# Patient Record
Sex: MALE | Race: WHITE | URBAN - METROPOLITAN AREA
[De-identification: names, ages, dates, MRNs, and addresses within clinical notes are randomized per-mention and may not be internally consistent; named-entity substitution may affect disease eponyms.]

---

## 2022-06-20 ENCOUNTER — APPOINTMENT (RX ONLY)
Dept: URBAN - METROPOLITAN AREA CLINIC 50 | Facility: CLINIC | Age: 62
Setting detail: DERMATOLOGY
End: 2022-06-20

## 2022-06-20 DIAGNOSIS — L40.0 PSORIASIS VULGARIS: ICD-10-CM | Status: INADEQUATELY CONTROLLED

## 2022-06-20 PROCEDURE — 99204 OFFICE O/P NEW MOD 45 MIN: CPT

## 2022-06-20 PROCEDURE — ? PRESCRIPTION MEDICATION MANAGEMENT

## 2022-06-20 PROCEDURE — ? MEDICATION COUNSELING

## 2022-06-20 PROCEDURE — ? ADDITIONAL NOTES

## 2022-06-20 PROCEDURE — ? COUNSELING

## 2022-06-20 ASSESSMENT — LOCATION ZONE DERM
LOCATION ZONE: FINGERNAIL
LOCATION ZONE: ARM
LOCATION ZONE: HAND
LOCATION ZONE: LEG
LOCATION ZONE: TRUNK

## 2022-06-20 ASSESSMENT — LOCATION DETAILED DESCRIPTION DERM
LOCATION DETAILED: LEFT MIDDLE FINGERNAIL
LOCATION DETAILED: RIGHT MIDDLE FINGERNAIL
LOCATION DETAILED: LEFT RADIAL DORSAL HAND
LOCATION DETAILED: RIGHT PROXIMAL DORSAL FOREARM
LOCATION DETAILED: RIGHT ULNAR DORSAL HAND
LOCATION DETAILED: LEFT PROXIMAL DORSAL FOREARM
LOCATION DETAILED: PERIUMBILICAL SKIN
LOCATION DETAILED: RIGHT KNEE
LOCATION DETAILED: LEFT KNEE
LOCATION DETAILED: SUPERIOR LUMBAR SPINE

## 2022-06-20 ASSESSMENT — LOCATION SIMPLE DESCRIPTION DERM
LOCATION SIMPLE: RIGHT FOREARM
LOCATION SIMPLE: LOWER BACK
LOCATION SIMPLE: LEFT FOREARM
LOCATION SIMPLE: LEFT MIDDLE FINGER
LOCATION SIMPLE: LEFT KNEE
LOCATION SIMPLE: RIGHT HAND
LOCATION SIMPLE: LEFT HAND
LOCATION SIMPLE: RIGHT MIDDLE FINGER
LOCATION SIMPLE: ABDOMEN
LOCATION SIMPLE: RIGHT KNEE

## 2022-06-20 ASSESSMENT — PGA PSORIASIS: PGA PSORIASIS 2020: MODERATE

## 2022-06-20 ASSESSMENT — BSA PSORIASIS: % BODY COVERED IN PSORIASIS: 45

## 2022-06-20 NOTE — HPI: PSORIASIS (PATIENT REPORTED)
Do You Have A Family History Of Psoriasis?: yes
Have You Been Diagnosed With Psoriatic Arthritis?: no
Where Is Your Psoriasis Located?: Hands, abdomen, legs
List Prescription Topical Steroids You Tried (Separate Each Name With A Comma):: Hydrocortisone cream
Please List The Treatments That Have Worked Best For Your Psoriasis: (Separate Each Entry With A Comma): Hydrocortisone, antibiotic, steroid

## 2022-06-20 NOTE — PROCEDURE: MEDICATION COUNSELING
Rifampin Counseling: I discussed with the patient the risks of rifampin including but not limited to liver damage, kidney damage, red-orange body fluids, nausea/vomiting and severe allergy. done

## 2022-06-20 NOTE — PROCEDURE: ADDITIONAL NOTES
Render Risk Assessment In Note?: no
Detail Level: Simple
Additional Notes: Patient's daughter states that Pt reported to PCP last week with complaints of significant joint swelling and pain in his bilateral hands for which he was prescribed a course of Bactrim and a steroid dose pack. His psoriasis had recently flared, but since starting medications has significantly improved. No dactylitis noted today on exam. Due to joint and nail involvement, pt will be referred to Rheumatology for full workout for psoriatic arthritis. Pending results and rheumatology recommendation, will consider starting patient on appropriate biologic. In mean time, will treat patient topically and symptomatically. He has only been using hydrocortisone on plaques. Recommend continuation of HTC for face, but will start pt on 4 weeks of Enstilar (provided pt with trade-size sample today) to be used on larger plaques. Recommend continuation of emollients and moisturizers including Eucerin cream, CeraVe moisturizing body wash, and Aveeno oatmeal baths. Avoid picking and starching at skin. Follow up in 4 weeks or sooner if needed. Patient and daughter verbalized understating and are agreeable to plan.

## 2022-06-20 NOTE — PROCEDURE: PRESCRIPTION MEDICATION MANAGEMENT
Continue Regimen: Hydrocortisone on face, ears, and on areas where pt is not using Enstilar\\nBactrim (until completion as prescribed by PCP)\\nSteroid dose pack (until completion as prescribed by PCP)
Initiate Treatment: Enstilar foam QD for four weeks on severe plaques. (Do not use on more than 30% of body SA.)
Plan: Referred to rheumatology for joint involvement. Follow up in 4 weeks or sooner if needed.
Samples Given: Enstilar foam (trade-size sample provided today)
Render In Strict Bullet Format?: No
Detail Level: Detailed

## 2022-06-20 NOTE — PROCEDURE: MEDICATION COUNSELING
Xelmeraz Pregnancy And Lactation Text: This medication is Pregnancy Category D and is not considered safe during pregnancy.  The risk during breast feeding is also uncertain.